# Patient Record
Sex: FEMALE | Race: WHITE
[De-identification: names, ages, dates, MRNs, and addresses within clinical notes are randomized per-mention and may not be internally consistent; named-entity substitution may affect disease eponyms.]

---

## 2020-04-11 ENCOUNTER — HOSPITAL ENCOUNTER (EMERGENCY)
Dept: HOSPITAL 62 - ER | Age: 50
Discharge: HOME | End: 2020-04-11
Payer: SELF-PAY

## 2020-04-11 VITALS — DIASTOLIC BLOOD PRESSURE: 58 MMHG | SYSTOLIC BLOOD PRESSURE: 116 MMHG

## 2020-04-11 DIAGNOSIS — M25.511: ICD-10-CM

## 2020-04-11 DIAGNOSIS — S40.021A: Primary | ICD-10-CM
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
## 2020-04-11 DIAGNOSIS — F17.200: ICD-10-CM
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
## 2020-04-11 PROCEDURE — 99283 EMERGENCY DEPT VISIT LOW MDM: CPT

## 2020-04-11 NOTE — ER DOCUMENT REPORT
HPI





- HPI


Time Seen by Provider: 04/11/20 14:41


Pain Level: 5


Notes: 





50-year-old female patient presents emergency department chief complaint of 

right shoulder pain.  Patient reports she fell a few days ago landing onto her 

right upper arm/shoulder.  She states she has tried taking over-the-counter pain

medications without relief.  Denies history of any trauma to this area 

previously, denies history of shoulder dislocations.  She reports normal motor 

and sensation distal to area of injury.








Past Medical History





- General


Information source: Patient





- Social History


Smoking Status: Current Every Day Smoker


Frequency of alcohol use: None


Drug Abuse: None


Family History: Reviewed & Not Pertinent


Patient has suicidal ideation: No


Patient has homicidal ideation: No





- Medical History


Medical History: Negative


Surgical Hx: Negative





Vertical Provider Document





- CONSTITUTIONAL


Notes: 





PHYSICAL EXAMINATION:





GENERAL: Well-appearing, well-nourished and in no acute distress.





HEAD: Atraumatic, normocephalic.





EYES: Pupils equal round extraocular movements intact,  conjunctiva are normal.





ENT: Nares patent





NECK: Normal range of motion





LUNGS: No respiratory distress





Musculoskeletal: Normal range of motion, tenderness to palpation to posterior 

right shoulder.  No crepitus or deformity noted.  Strong radial pulse, cap 

refill less than 3 seconds distally.





NEUROLOGICAL:  Normal speech, normal gait. 





PSYCH: Normal mood, normal affect.





SKIN: Warm, Dry, normal turgor, no rashes or lesions noted.





Course





- Re-evaluation


Re-evalutation: 





Shoulder x-ray negative for any acute findings.  Patient will be discharged home

at this time with strict ED return precautions.  Patient verbalizes 

understanding and agreement with plan.








- Vital Signs


Vital signs: 


                                        











Temp Pulse Resp BP Pulse Ox


 


 97.8 F   67   16   92/66 L  99 


 


 04/11/20 14:27  04/11/20 14:27  04/11/20 14:27  04/11/20 14:27  04/11/20 14:27














Discharge





- Discharge


Clinical Impression: 


Contusion of right arm


Qualifiers:


 Encounter type: initial encounter Qualified Code(s): S40.021A - Contusion of 

right upper arm, initial encounter





Condition: Stable


Disposition: HOME, SELF-CARE


Additional Instructions: 


Contusion





     Your injury has resulted in a contusion -- a crushing of the deep tissues. 

No injury to important structures was detected during the physician's exam.  

Contusions vary in the amount of pain they cause, and in the length of time 

required for healing.  Typically, the area will become bruised, and will remain 

painful to touch for two or three weeks.  However, most patients are back to 

working and playing within a few days.


     After the initial period of rest and cold-packs, your symptoms (together 

with the doctor's recommendations) will determine how rapidly you can get back 

to full activity.  Usually this means "do what feels okay, but don't do things 

that hurt."


     If re-examination was recommended, it's important to follow up as 

instructed.  Call the doctor or return any time if pain increases, if swelling 

becomes severe, if you develop numbness or weakness in an injured extremity, or 

if any other alarming symptoms occur.





Ice & Elevation





     Apply ice packs frequently against the painful area.  Many different 

schedules are recommended, such as "20 minutes on, 20 minutes off" or "one hour 

ice, two hours rest."  If you need to work, you may need to go longer between 

ice treatments.  You should plan to have the area ice packed AT LEAST one-fourth

of the time.


     The ice should be applied over the wrap, tape, or splint, or over a layer 

of cloth -- not directly against the skin.  Some ice bags have a built-in cloth 

and can be put directly on the skin.


     Your injured part should be elevated as much as possible over the next 48 

hours.  Try to keep the injury above the level of the heart. Avoid use of the 

injured area.  Elevation and rest will decrease the swelling.





Ibuprofen





     Ibuprofen is an excellent, safe drug for pain control.  In addition, it has

potent antiinflammatory effects which are beneficial, especially in the 

treatment of injuries, arthritis, or tendonitis. It's best to take ibuprofen 

with food.  Persons with ulcer disease or allergy to aspirin should notify their

physician of this before taking ibuprofen.


     Take the medication exactly as prescribed.  Don't take additional doses 

unless instructed to do so by your doctor.  If you develop wheezing, shortness 

of breath, hives, faintness, stomach pain, vomiting, or dark black stools, 

return for re-evaluation at once.





****The x-rays were negative for any fracture or dislocation.  Please take ibupr

ofen over-the-counter as directed to help with pain and inflammation.****


Prescriptions: 


Hydrocodone/Acetaminophen [Norco 5-325 mg Tablet] 0.5 - 1 tab PO Q4H #8 tablet

## 2020-04-11 NOTE — RADIOLOGY REPORT (SQ)
EXAM DESCRIPTION:  HUMERUS RIGHT



IMAGES COMPLETED DATE/TIME:  4/11/2020 2:55 pm



REASON FOR STUDY:  fall



COMPARISON:  None.



NUMBER OF VIEWS:  Two views.



TECHNIQUE:  Two radiographic images were acquired of the right humerus to include elbow and shoulder 
in at least one projection.



LIMITATIONS:  None.



FINDINGS:  MINERALIZATION: Normal.

BONES: No acute fracture or dislocation.  No worrisome bone lesions.

SOFT TISSUES: No obvious swelling or foreign body.

OTHER: No other significant finding.



IMPRESSION:  NEGATIVE STUDY OF THE RIGHT HUMERUS. NO RADIOGRAPHIC EVIDENCE OF ACUTE INJURY.



TECHNICAL DOCUMENTATION:  JOB ID:  [id number]

 2011 [employer]- All Rights Reserved



Reading location - IP/workstation name: [email or web address]